# Patient Record
Sex: FEMALE | Race: BLACK OR AFRICAN AMERICAN | Employment: FULL TIME | URBAN - METROPOLITAN AREA
[De-identification: names, ages, dates, MRNs, and addresses within clinical notes are randomized per-mention and may not be internally consistent; named-entity substitution may affect disease eponyms.]

---

## 2018-09-26 ENCOUNTER — HOSPITAL ENCOUNTER (EMERGENCY)
Age: 28
Discharge: HOME | End: 2018-09-26
Attending: EMERGENCY MEDICINE
Payer: COMMERCIAL

## 2018-09-26 VITALS
DIASTOLIC BLOOD PRESSURE: 73 MMHG | WEIGHT: 160 LBS | BODY MASS INDEX: 27.31 KG/M2 | TEMPERATURE: 99.7 F | HEIGHT: 64 IN | SYSTOLIC BLOOD PRESSURE: 137 MMHG | OXYGEN SATURATION: 100 % | RESPIRATION RATE: 18 BRPM | HEART RATE: 92 BPM
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
## 2018-09-26 DIAGNOSIS — K08.89 TOOTH PAIN: ICD-10-CM

## 2018-09-26 DIAGNOSIS — S01.511A LIP LACERATION, INITIAL ENCOUNTER: Primary | ICD-10-CM

## 2018-09-26 PROCEDURE — 4500000022 HC ED LEVEL 2 PROCEDURE

## 2018-09-26 PROCEDURE — 99282 EMERGENCY DEPT VISIT SF MDM: CPT

## 2018-09-26 PROCEDURE — 2500000003 HC RX 250 WO HCPCS

## 2018-09-26 RX ORDER — CLINDAMYCIN HYDROCHLORIDE 150 MG/1
300 CAPSULE ORAL 4 TIMES DAILY
Qty: 24 CAPSULE | Refills: 0 | Status: SHIPPED | OUTPATIENT
Start: 2018-09-26 | End: 2018-09-29

## 2018-09-26 RX ORDER — CLINDAMYCIN HYDROCHLORIDE 300 MG/1
300 CAPSULE ORAL ONCE
Status: DISCONTINUED | OUTPATIENT
Start: 2018-09-26 | End: 2018-09-26 | Stop reason: HOSPADM

## 2018-09-26 RX ORDER — LIDOCAINE HYDROCHLORIDE 20 MG/ML
INJECTION, SOLUTION INFILTRATION; PERINEURAL
Status: COMPLETED
Start: 2018-09-26 | End: 2018-09-26

## 2018-09-26 RX ORDER — LIDOCAINE HYDROCHLORIDE 20 MG/ML
5 INJECTION, SOLUTION INFILTRATION; PERINEURAL ONCE
Status: COMPLETED | OUTPATIENT
Start: 2018-09-26 | End: 2018-09-26

## 2018-09-26 RX ADMIN — LIDOCAINE HYDROCHLORIDE 5 ML: 20 INJECTION, SOLUTION INFILTRATION; PERINEURAL at 20:16

## 2018-09-26 ASSESSMENT — PAIN SCALES - GENERAL
PAINLEVEL_OUTOF10: 7
PAINLEVEL_OUTOF10: 7

## 2018-09-26 ASSESSMENT — PAIN DESCRIPTION - ORIENTATION: ORIENTATION: LOWER

## 2018-09-26 ASSESSMENT — PAIN DESCRIPTION - PAIN TYPE: TYPE: ACUTE PAIN

## 2018-09-26 ASSESSMENT — PAIN DESCRIPTION - LOCATION: LOCATION: MOUTH

## 2018-09-26 ASSESSMENT — PAIN DESCRIPTION - DESCRIPTORS: DESCRIPTORS: BURNING;THROBBING

## 2018-09-26 ASSESSMENT — PAIN DESCRIPTION - FREQUENCY: FREQUENCY: CONTINUOUS

## 2018-09-26 NOTE — ED PROVIDER NOTES
Psychiatric/Behavioral: Negative for confusion. Positives and Pertinent negatives as per HPI. PAST MEDICAL HISTORY   No past medical history on file. SURGICAL HISTORY       Past Surgical History:   Procedure Laterality Date    BREAST ENHANCEMENT SURGERY           CURRENT MEDICATIONS       Discharge Medication List as of 9/26/2018  8:24 PM      CONTINUE these medications which have NOT CHANGED    Details   Norethin Ace-Eth Estrad-FE (BLISOVI 24 FE PO) Take by mouthHistorical Med             ALLERGIES     Amoxicillin    FAMILY HISTORY     No family history on file. SOCIAL HISTORY       Social History     Social History    Marital status: Single     Spouse name: N/A    Number of children: N/A    Years of education: N/A     Social History Main Topics    Smoking status: Never Smoker    Smokeless tobacco: Never Used    Alcohol use Yes      Comment: sicially    Drug use: No    Sexual activity: Not on file     Other Topics Concern    Not on file     Social History Narrative    No narrative on file       SCREENINGS             PHYSICAL EXAM    (up to 7 for level 4, 8 or more for level 5)     ED Triage Vitals [09/26/18 1827]   BP Temp Temp Source Pulse Resp SpO2 Height Weight   137/73 99.7 °F (37.6 °C) Oral 92 18 100 % 5' 4\" (1.626 m) 160 lb (72.6 kg)       Physical Exam   Constitutional: She is oriented to person, place, and time. Vital signs are normal. She appears well-developed and well-nourished. She is active and cooperative. Non-toxic appearance. She does not have a sickly appearance. She does not appear ill. No distress. HENT:   Head: Normocephalic. Not macrocephalic and not microcephalic. Head is with laceration. Head is without raccoon's eyes, without De La Cruz's sign, without abrasion, without contusion, without right periorbital erythema and without left periorbital erythema.  Hair is normal.   Right Ear: External ear normal.   Left Ear: External ear normal.   Mouth/Throat:
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
## 2018-09-27 ASSESSMENT — ENCOUNTER SYMPTOMS
VOMITING: 0
EYE ITCHING: 0
NAUSEA: 0
TROUBLE SWALLOWING: 0
BACK PAIN: 0
PHOTOPHOBIA: 0
SHORTNESS OF BREATH: 0
COLOR CHANGE: 0
EYE REDNESS: 0
EYE PAIN: 0
EYE DISCHARGE: 0
ABDOMINAL PAIN: 0

## 2018-09-27 NOTE — ED NOTES
Patient verbalized understanding of d/c instructions. Patient given scripts x 1. Patient left the ED and instructed on follow up.         Abdirahman Ortiz RN  09/26/18 2028

## 2019-10-28 ENCOUNTER — APPOINTMENT (OUTPATIENT)
Dept: GENERAL RADIOLOGY | Age: 29
End: 2019-10-28
Payer: COMMERCIAL

## 2019-10-28 ENCOUNTER — HOSPITAL ENCOUNTER (EMERGENCY)
Age: 29
Discharge: HOME | End: 2019-10-28
Attending: EMERGENCY MEDICINE
Payer: COMMERCIAL

## 2019-10-28 VITALS
DIASTOLIC BLOOD PRESSURE: 82 MMHG | RESPIRATION RATE: 14 BRPM | OXYGEN SATURATION: 99 % | HEIGHT: 64 IN | HEART RATE: 81 BPM | TEMPERATURE: 98.8 F | BODY MASS INDEX: 29.38 KG/M2 | SYSTOLIC BLOOD PRESSURE: 121 MMHG | WEIGHT: 172.1 LBS

## 2019-10-28 DIAGNOSIS — S39.012A BACK STRAIN, INITIAL ENCOUNTER: Primary | ICD-10-CM

## 2019-10-28 LAB
BACTERIA: ABNORMAL /HPF
BILIRUBIN URINE: NEGATIVE
BLOOD, URINE: ABNORMAL
CLARITY: CLEAR
COLOR: YELLOW
EPITHELIAL CELLS, UA: ABNORMAL /HPF
GLUCOSE URINE: NEGATIVE MG/DL
HCG(URINE) PREGNANCY TEST: NEGATIVE
KETONES, URINE: NEGATIVE MG/DL
LEUKOCYTE ESTERASE, URINE: NEGATIVE
MICROSCOPIC EXAMINATION: YES
MUCUS: ABNORMAL /LPF
NITRITE, URINE: NEGATIVE
PH UA: 6 (ref 5–8)
PROTEIN UA: NEGATIVE MG/DL
RBC UA: ABNORMAL /HPF (ref 0–2)
SPECIFIC GRAVITY UA: >=1.03 (ref 1–1.03)
URINE REFLEX TO CULTURE: ABNORMAL
URINE TYPE: ABNORMAL
UROBILINOGEN, URINE: 0.2 E.U./DL
WBC UA: ABNORMAL /HPF (ref 0–5)

## 2019-10-28 PROCEDURE — 72100 X-RAY EXAM L-S SPINE 2/3 VWS: CPT

## 2019-10-28 PROCEDURE — 99283 EMERGENCY DEPT VISIT LOW MDM: CPT

## 2019-10-28 PROCEDURE — 84703 CHORIONIC GONADOTROPIN ASSAY: CPT

## 2019-10-28 PROCEDURE — 81001 URINALYSIS AUTO W/SCOPE: CPT

## 2019-10-28 RX ORDER — METHYLPREDNISOLONE 4 MG/1
TABLET ORAL
Qty: 1 KIT | Refills: 0 | Status: SHIPPED | OUTPATIENT
Start: 2019-10-28 | End: 2019-11-03

## 2019-10-28 ASSESSMENT — PAIN SCALES - GENERAL
PAINLEVEL_OUTOF10: 6
PAINLEVEL_OUTOF10: 5

## 2019-10-28 ASSESSMENT — PAIN DESCRIPTION - LOCATION
LOCATION: BACK
LOCATION: BACK

## 2019-10-28 ASSESSMENT — PAIN DESCRIPTION - ORIENTATION
ORIENTATION: MID
ORIENTATION: MID

## 2019-10-28 ASSESSMENT — PAIN DESCRIPTION - DESCRIPTORS
DESCRIPTORS: ACHING
DESCRIPTORS: ACHING

## 2019-10-28 ASSESSMENT — PAIN DESCRIPTION - PAIN TYPE
TYPE: ACUTE PAIN
TYPE: ACUTE PAIN

## 2023-11-14 ENCOUNTER — HOSPITAL ENCOUNTER (EMERGENCY)
Age: 33
Discharge: HOME | End: 2023-11-14
Attending: EMERGENCY MEDICINE
Payer: COMMERCIAL

## 2023-11-14 VITALS
BODY MASS INDEX: 29.64 KG/M2 | HEART RATE: 63 BPM | HEIGHT: 64 IN | TEMPERATURE: 97.8 F | OXYGEN SATURATION: 99 % | WEIGHT: 173.6 LBS | DIASTOLIC BLOOD PRESSURE: 86 MMHG | RESPIRATION RATE: 16 BRPM | SYSTOLIC BLOOD PRESSURE: 108 MMHG

## 2023-11-14 DIAGNOSIS — K02.9 PAIN DUE TO DENTAL CARIES: Primary | ICD-10-CM

## 2023-11-14 PROCEDURE — 99283 EMERGENCY DEPT VISIT LOW MDM: CPT

## 2023-11-14 RX ORDER — CLINDAMYCIN HYDROCHLORIDE 300 MG/1
300 CAPSULE ORAL 4 TIMES DAILY
Qty: 40 CAPSULE | Refills: 0 | Status: SHIPPED | OUTPATIENT
Start: 2023-11-14 | End: 2023-11-24

## 2023-11-14 RX ORDER — IBUPROFEN 800 MG/1
800 TABLET ORAL EVERY 8 HOURS PRN
Qty: 30 TABLET | Refills: 0 | Status: SHIPPED | OUTPATIENT
Start: 2023-11-14

## 2023-11-14 RX ORDER — FLUCONAZOLE 150 MG/1
150 TABLET ORAL ONCE
Qty: 1 TABLET | Refills: 0 | Status: SHIPPED | OUTPATIENT
Start: 2023-11-14 | End: 2023-11-14

## 2023-11-14 RX ORDER — POLYMYXIN B SULFATE, BACITRACIN ZINC AND NEOMYCIN SULFATE 400; 3.5; 5 [USP'U]/G; MG/G; [USP'U]/G
OINTMENT TOPICAL
COMMUNITY
Start: 2023-11-06

## 2023-11-14 ASSESSMENT — PAIN SCALES - GENERAL: PAINLEVEL_OUTOF10: 6

## 2023-11-14 ASSESSMENT — PAIN DESCRIPTION - ORIENTATION: ORIENTATION: LEFT;LOWER

## 2023-11-14 ASSESSMENT — PAIN - FUNCTIONAL ASSESSMENT: PAIN_FUNCTIONAL_ASSESSMENT: 0-10

## 2023-11-14 NOTE — DISCHARGE INSTRUCTIONS
Dental Emergency Referrals    [address] [facility] residents only)    [facility] AT [place]  [address] [facility]. [phone]   [address]  [phone]   [place]  [phone]   [facility] AT [place]  (entrance on [address] [facility] [facility]  [address] [facility]  [phone]   [facility]   [address]  [phone]   [facility]  [address] [facility].  [phone]       [address] [place] [address] South offering [address]  [phone]   [facility].  [phone]     [facility] MEDICAL [place]  [address] [facility] 2nd [address]  [phone]   [facility]  [address] [phone] ([address])  [place]: [address] [facility] [address]: [address] [place]   [phone] [address] [facility]/ [facility]  [phone]     [facility]  [address] [facility]  [phone]   [address] [facility]  [address]  [address] [facility]  [address] [facility].  Oral Surgery Dept: [phone]  Dental Clinic: [address]  [address] [place] [address] Road,2Nd Floor  [phone]     [address]  [address] [phone]   [facility]   [address], [address], [address]  St : [address] [facility] : [phone]     [facility]  [address] [facility] [address] [facility]    Other [address] in the area    [facility] (Dental Urgent Care)  [place]  (Across from [facility])  [address] [facility], [facility]  [phone]?       Pediatric Only Dentists    [address] [facility] [facility] [address]   Up to age 21  [address]  Up to age 24  [place]:
68

## 2023-11-14 NOTE — ED NOTES
Educated pt on  x3 prescriptions and discharge paperwork as well as follow-up appointment. Pt verbalizes understanding of all instructions and denies questions. Pt left ambulatory by self with all personal belongings, and discharge paperwork to private residence. Pt in no distress at this time. Prescriptions x3 sent as E-Scripts. Pt instructed on how to  prescriptions. Pt verbalizes understanding.      Neeraj Mccoy RN  11/14/23 5078

## 2023-11-14 NOTE — ED PROVIDER NOTES
Emergency Physician Note  [address]    Pt Name: Roxy Hector  MRN: [id number]  [address] [facility] 1990  Date of evaluation: 11/14/2023  Provider: Armani Rubio MD  PCP: Sidney Gaxiola MD    Note Open Time: 6:23 PM EST 11/14/23    Chief Complaint  Dental Pain (L lower rear dental pain x1 week, worsening yesterday. Pt states that she thinks that it is her wisdom tooth. Pt states that she has an appointment with her dentist on 11.27 and that they were unable to see her sooner. States that the pain is radiating across L head.)       History of Present Illness  Roxy Hector is a 29 y.o. female who presents to the ED for tooth pain. Patient reports that she has multiple carious and painful teeth. No fevers or any trouble breathing or trouble swallowing. She is scheduled to see a dentist in about 2 weeks but did not feel she could wait that long. History from : Patient    Limitations to history : None    REVIEW OF SYSTEMS :      Review of Systems    Positives and Pertinent negatives as per HPI. Medications/allergies/medical/social/family history  I have reviewed the following from the nursing documentation:      Prior to Admission medications    Medication Sig Start Date End Date Taking?  Authorizing Provider   ibuprofen (ADVIL;MOTRIN) 800 MG tablet Take 1 tablet by mouth every 8 hours as needed for Pain 11/14/23  Yes Armani Rubio MD   clindamycin (CLEOCIN) 300 MG capsule Take 1 capsule by mouth 4 times daily for 10 days 11/14/23 11/24/23 Yes Armani Rubio MD   fluconazole (DIFLUCAN) 150 MG tablet Take 1 tablet by mouth once for 1 dose 11/14/23 11/14/23 Yes Armani Rubio MD   EFSEY 239-04-35 MG TABS per tablet  11/6/23   ProviderAndrea MD   Norethin Ace-Eth Estrad-FE (BLISOVI 24 FE PO) Take by mouth    ProviderAndrea MD       Allergies as of 11/14/2023 - Review Complete 10/28/2019   Allergen Reaction Noted    Amoxicillin Hives 05/25/2017       History

## 2023-12-12 ENCOUNTER — HOSPITAL ENCOUNTER (EMERGENCY)
Age: 33
Discharge: HOME | End: 2023-12-12
Attending: EMERGENCY MEDICINE
Payer: COMMERCIAL

## 2023-12-12 VITALS
WEIGHT: 171.4 LBS | HEIGHT: 64 IN | TEMPERATURE: 98.9 F | OXYGEN SATURATION: 100 % | DIASTOLIC BLOOD PRESSURE: 90 MMHG | HEART RATE: 62 BPM | BODY MASS INDEX: 29.26 KG/M2 | SYSTOLIC BLOOD PRESSURE: 139 MMHG | RESPIRATION RATE: 10 BRPM

## 2023-12-12 DIAGNOSIS — K02.9 DENTAL CARIES: ICD-10-CM

## 2023-12-12 DIAGNOSIS — K04.7 DENTAL INFECTION: Primary | ICD-10-CM

## 2023-12-12 PROCEDURE — 99283 EMERGENCY DEPT VISIT LOW MDM: CPT

## 2023-12-12 PROCEDURE — 6370000000 HC RX 637 (ALT 250 FOR IP): Performed by: EMERGENCY MEDICINE

## 2023-12-12 RX ORDER — CLINDAMYCIN HYDROCHLORIDE 300 MG/1
300 CAPSULE ORAL 4 TIMES DAILY
Qty: 40 CAPSULE | Refills: 0 | Status: SHIPPED | OUTPATIENT
Start: 2023-12-12 | End: 2023-12-22

## 2023-12-12 RX ORDER — CLINDAMYCIN HYDROCHLORIDE 300 MG/1
300 CAPSULE ORAL ONCE
Status: COMPLETED | OUTPATIENT
Start: 2023-12-12 | End: 2023-12-12

## 2023-12-12 RX ORDER — CHLORHEXIDINE GLUCONATE ORAL RINSE 1.2 MG/ML
15 SOLUTION DENTAL 2 TIMES DAILY
Qty: 420 ML | Refills: 0 | Status: SHIPPED | OUTPATIENT
Start: 2023-12-12 | End: 2023-12-26

## 2023-12-12 RX ADMIN — CLINDAMYCIN HYDROCHLORIDE 300 MG: 300 CAPSULE ORAL at 23:23

## 2023-12-12 ASSESSMENT — PAIN DESCRIPTION - LOCATION: LOCATION: TEETH

## 2023-12-12 ASSESSMENT — PAIN DESCRIPTION - PAIN TYPE: TYPE: ACUTE PAIN

## 2023-12-12 ASSESSMENT — PAIN SCALES - GENERAL: PAINLEVEL_OUTOF10: 5

## 2023-12-12 ASSESSMENT — PAIN DESCRIPTION - FREQUENCY: FREQUENCY: INTERMITTENT

## 2023-12-12 ASSESSMENT — PAIN - FUNCTIONAL ASSESSMENT: PAIN_FUNCTIONAL_ASSESSMENT: 0-10

## 2023-12-12 ASSESSMENT — PAIN DESCRIPTION - ORIENTATION: ORIENTATION: LEFT;LOWER

## 2023-12-12 ASSESSMENT — PAIN DESCRIPTION - DESCRIPTORS: DESCRIPTORS: ACHING;THROBBING

## 2023-12-13 NOTE — ED NOTES
Pt dc/d with instructions in stable condition, ambulatory to lobby. Home per ride.      Joao Gutierrez RN  12/12/23 3544